# Patient Record
Sex: FEMALE | Race: WHITE | NOT HISPANIC OR LATINO | Employment: OTHER | URBAN - METROPOLITAN AREA
[De-identification: names, ages, dates, MRNs, and addresses within clinical notes are randomized per-mention and may not be internally consistent; named-entity substitution may affect disease eponyms.]

---

## 2022-02-01 ENCOUNTER — PREPPED CHART (OUTPATIENT)
Dept: URBAN - METROPOLITAN AREA CLINIC 10 | Facility: CLINIC | Age: 77
End: 2022-02-01

## 2022-02-01 PROBLEM — H04.123 DRY EYE SYNDROME: Noted: 2022-02-01

## 2022-02-01 PROBLEM — Z96.1 PSEUDOPHAKIA: Noted: 2022-02-01

## 2022-02-01 PROBLEM — H11.32 SUBCONJUNCTIVAL HEMORRHAGE: Noted: 2021-12-09

## 2022-02-01 PROBLEM — H52.4 PRESBYOPIA: Noted: 2022-02-01

## 2022-02-01 PROBLEM — H00.013 EXTERNAL HORDEOLUM: Noted: 2022-02-01

## 2022-09-17 ENCOUNTER — APPOINTMENT (EMERGENCY)
Dept: CT IMAGING | Facility: HOSPITAL | Age: 77
End: 2022-09-17
Payer: COMMERCIAL

## 2022-09-17 ENCOUNTER — APPOINTMENT (OUTPATIENT)
Dept: RADIOLOGY | Facility: HOSPITAL | Age: 77
End: 2022-09-17
Payer: COMMERCIAL

## 2022-09-17 ENCOUNTER — HOSPITAL ENCOUNTER (EMERGENCY)
Facility: HOSPITAL | Age: 77
Discharge: HOME | End: 2022-09-18
Attending: EMERGENCY MEDICINE
Payer: COMMERCIAL

## 2022-09-17 VITALS
RESPIRATION RATE: 18 BRPM | DIASTOLIC BLOOD PRESSURE: 77 MMHG | TEMPERATURE: 97.6 F | HEART RATE: 88 BPM | WEIGHT: 148.81 LBS | BODY MASS INDEX: 24.79 KG/M2 | OXYGEN SATURATION: 96 % | SYSTOLIC BLOOD PRESSURE: 164 MMHG | HEIGHT: 65 IN

## 2022-09-17 DIAGNOSIS — S42.291A HUMERUS HEAD FRACTURE, RIGHT, CLOSED, INITIAL ENCOUNTER: Primary | ICD-10-CM
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
## 2022-09-17 PROCEDURE — 73030 X-RAY EXAM OF SHOULDER: CPT

## 2022-09-17 PROCEDURE — 73060 X-RAY EXAM OF HUMERUS: CPT

## 2022-09-17 PROCEDURE — 70450 CT HEAD/BRAIN W/O DYE: CPT

## 2022-09-17 PROCEDURE — G1004 CDSM NDSC: HCPCS

## 2022-09-17 PROCEDURE — 99284 EMERGENCY DEPT VISIT MOD MDM: CPT

## 2022-09-17 PROCEDURE — 72125 CT NECK SPINE W/O DYE: CPT

## 2022-09-17 RX ORDER — OXYCODONE HYDROCHLORIDE AND ACETAMINOPHEN 5; 325 MG/1; MG/1
1 TABLET ORAL ONCE
Status: COMPLETED | OUTPATIENT
Start: 2022-09-17 | End: 2022-09-17

## 2022-09-17 RX ORDER — OXYCODONE HYDROCHLORIDE AND ACETAMINOPHEN 5; 325 MG/1; MG/1
1 TABLET ORAL EVERY 6 HOURS PRN
Qty: 10 TABLET | Refills: 0 | Status: SHIPPED | OUTPATIENT
Start: 2022-09-17

## 2022-09-17 RX ORDER — OXYCODONE HYDROCHLORIDE AND ACETAMINOPHEN 5; 325 MG/1; MG/1
1 TABLET ORAL ONCE
Status: COMPLETED | OUTPATIENT
Start: 2022-09-17 | End: 2022-09-18

## 2022-09-17 RX ADMIN — OXYCODONE HYDROCHLORIDE AND ACETAMINOPHEN 1 TABLET: 5; 325 TABLET ORAL at 21:40

## 2022-09-18 PROCEDURE — 99284 EMERGENCY DEPT VISIT MOD MDM: CPT | Performed by: EMERGENCY MEDICINE

## 2022-09-18 RX ADMIN — OXYCODONE HYDROCHLORIDE AND ACETAMINOPHEN 1 TABLET: 5; 325 TABLET ORAL at 00:11

## 2022-09-18 NOTE — ED ATTENDING ATTESTATION
9/17/2022  Bethany SCHROEDER Postal, DO, saw and evaluated the patient  I have discussed the patient with the resident/non-physician practitioner and agree with the resident's/non-physician practitioner's findings, Plan of Care, and MDM as documented in the resident's/non-physician practitioner's note, except where noted  All available labs and Radiology studies were reviewed  I was present for key portions of any procedure(s) performed by the resident/non-physician practitioner and I was immediately available to provide assistance  At this point I agree with the current assessment done in the Emergency Department  I have conducted an independent evaluation of this patient a history and physical is as follows:    Patient is a 77-year-old female with a history of atrial fibrillation on Eliquis who presents with a fall  Patient states that she tripped and fell onto her right shoulder  She denies head injury or loss of consciousness  She denies any symptoms preceding the fall, including but not limited to chest pain, palpitations, shortness of breath, diaphoresis or other concerns  She developed acute pain in her right shoulder secondary to the fall  She is unable to move her right upper extremity secondary to pain  She denies any other injuries  On exam, patient is in mild distress secondary to pain  Her right arm is held in adduction  Tenderness to palpation over the right shoulder  Distal pulses intact  Motor, sensation normal   No midline tenderness in the cervical region  Awake, alert and oriented x3  Heart is irregularly irregular  Breath sounds normal   Abdomen is soft, nontender, nondistended  X-ray shows humeral head fracture  Patient is neurovascularly intact  CT head and cervical spine reveal no acute traumatic injury  Patient was given pain medication in the emergency department, with some improvement  Discussed discharge home versus hospitalization for pain control    Patient is comfortable with discharge and will stay with her daughter  She was placed in an arm sling  She is discharged in good condition with outpatient follow-up with Orthopedic surgery  Portions of the above record have been created with voice recognition software  Occasional wrong word or "sound alike" substitutions may have occurred due to the inherent limitations of voice recognition software  Read the chart carefully and recognize, using context, where substitutions may have occurred        ED Course         Critical Care Time  Procedures

## 2022-09-18 NOTE — DISCHARGE INSTRUCTIONS
Please apply ice, 15 minutes at a time, as needed for pain and swelling  Please take tylenol(acetaminophen) 500mg every 4-6 hours as needed for pain  You may take percocet every 6 hours as needed for severe pain  Please do not drive or operate dangerous equipment while taking percocet  Please follow up with orthopedics on Monday for further management of your broken arm  If you develop numbness or tingling of your right arm or hand, please return to the ER

## 2022-09-18 NOTE — ED PROVIDER NOTES
History  Chief Complaint   Patient presents with    Fall     Patient arrived via EMS from [place], c/o right shoulder pain and pain radiating down to elbow  Patient tripped when walking on grass and fell, landing on her right shoulder  +/thinners, -/head strike  Patient is able to move fingers and denies numbness and tingling in hand  Ms Liyah Sparks is a 67 yof presenting with right arm pain s/p mechanical fall on grass  States she was walking on grassy surface, tripped and fell, landing directly on right shoulder  Denies LOC or headstrike, however is on Eliquis  Noted immediate onset of severe right shoulder and upper arm pain, which radiates to right elbow, also unable to move right shoulder but is able to range right elbow, wrist, and fingers, pain is sharp to throbbing in nature, currently 10/10  Has mild scrape on right knee, not currently bleeding, without underlying knee pain, injury, swelling, or decreased ROM  Denies any symptoms of presyncope, neurologic issue, or cardiac issues prior to fall  Denies headache, neck or back pain, blurred vision, lightheadedness, dizziness, chest pain, shortness of breath, abdominal pain, nausea, vomiting, hip pain, difficulty ambulating, or any other injuries  Denies numbness, tingling, coldness, or color changes to her right arm or hand  Last tetanus "years" ago, however adamantly refuses booster  No prior right arm or shoulder injuries  Lives alone  None       History reviewed  No pertinent past medical history  History reviewed  No pertinent surgical history  History reviewed  No pertinent family history  I have reviewed and agree with the history as documented  E-Cigarette/Vaping     E-Cigarette/Vaping Substances           Review of Systems   Constitutional: Negative  Negative for appetite change, chills, diaphoresis, fatigue and fever  HENT: Negative for ear pain, facial swelling, nosebleeds, trouble swallowing and voice change  Eyes: Negative  Negative for photophobia and visual disturbance  Respiratory: Negative  Negative for cough and shortness of breath  Cardiovascular: Negative  Negative for chest pain, palpitations and leg swelling  Gastrointestinal: Negative for abdominal distention, abdominal pain, blood in stool, diarrhea, nausea and vomiting  Endocrine: Negative  Genitourinary: Negative  Negative for decreased urine volume and dysuria  Musculoskeletal: Positive for arthralgias  Negative for back pain, gait problem, neck pain and neck stiffness  Right shoulder pain  Allergic/Immunologic: Negative  Neurological: Negative  Negative for dizziness, syncope, facial asymmetry, speech difficulty, weakness, light-headedness, numbness and headaches  Hematological: Bruises/bleeds easily  Easy bruising 2/2 Eliquis  Psychiatric/Behavioral: Negative  Negative for confusion  All other systems reviewed and are negative  Physical Exam  ED Triage Vitals [09/17/22 2038]   Temperature Pulse Respirations Blood Pressure SpO2   97 6 °F (36 4 °C) 87 18 159/74 98 %      Temp Source Heart Rate Source Patient Position - Orthostatic VS BP Location FiO2 (%)   Oral Monitor Sitting Right arm --      Pain Score       10 - Worst Possible Pain             Orthostatic Vital Signs  Vitals:    09/17/22 2038 09/17/22 2245   BP: 159/74 164/77   Pulse: 87 88   Patient Position - Orthostatic VS: Sitting Lying       Physical Exam  Vitals and nursing note reviewed  Exam conducted with a chaperone present  Constitutional:       Appearance: Normal appearance  She is not diaphoretic  HENT:      Head: Normocephalic and atraumatic  Right Ear: Tympanic membrane and external ear normal       Left Ear: Tympanic membrane and external ear normal       Nose: Nose normal       Mouth/Throat:      Mouth: Mucous membranes are moist       Pharynx: Oropharynx is clear     Eyes:      Extraocular Movements: Extraocular movements intact  Conjunctiva/sclera: Conjunctivae normal       Pupils: Pupils are equal, round, and reactive to light  Cardiovascular:      Rate and Rhythm: Normal rate and regular rhythm  Pulses: Normal pulses  Heart sounds: Normal heart sounds  No murmur heard  No friction rub  No gallop  Pulmonary:      Effort: Pulmonary effort is normal  No respiratory distress  Breath sounds: Normal breath sounds  No wheezing, rhonchi or rales  Chest:      Chest wall: No tenderness  Abdominal:      General: Abdomen is flat  Bowel sounds are normal  There is no distension  Palpations: Abdomen is soft  Tenderness: There is no abdominal tenderness  There is no guarding or rebound  Musculoskeletal:      Right shoulder: Swelling and tenderness present  No bony tenderness  Decreased range of motion  Normal pulse  Left shoulder: Normal       Right upper arm: Swelling, deformity, tenderness and bony tenderness present  No lacerations  Left upper arm: Normal       Right elbow: Normal       Left elbow: Normal       Right forearm: Normal       Left forearm: Normal       Right wrist: Normal  Normal pulse  Left wrist: Normal  Normal pulse  Right hand: Normal strength  Normal sensation  There is no disruption of two-point discrimination  Normal capillary refill  Normal pulse  Left hand: Normal strength  Normal sensation  There is no disruption of two-point discrimination  Normal capillary refill  Normal pulse  Cervical back: Normal, normal range of motion and neck supple  No rigidity or tenderness  Thoracic back: Normal       Lumbar back: Normal       Right hip: Normal       Left hip: Normal       Right upper leg: Normal       Left upper leg: Normal       Right knee: No swelling, deformity, effusion, erythema, bony tenderness or crepitus  Normal range of motion  No tenderness        Left knee: Normal       Right lower leg: Normal       Left lower leg: Normal       Right ankle: Normal       Left ankle: Normal       Right foot: Normal       Left foot: Normal       Comments: Bony tenderness appreciated over the proximal humerus with mild surrounding swelling  Minor abrasion overlying right patella, not actively bleeding  Lymphadenopathy:      Cervical: No cervical adenopathy  Skin:     General: Skin is warm and dry  Capillary Refill: Capillary refill takes less than 2 seconds  Coloration: Skin is not pale  Findings: No erythema or rash  Neurological:      General: No focal deficit present  Mental Status: She is alert and oriented to person, place, and time  Cranial Nerves: No cranial nerve deficit  Sensory: No sensory deficit  Psychiatric:         Mood and Affect: Mood normal          Behavior: Behavior normal          ED Medications  Medications   oxyCODONE-acetaminophen (PERCOCET) 5-325 mg per tablet 1 tablet (1 tablet Oral Given 9/17/22 2140)   oxyCODONE-acetaminophen (PERCOCET) 5-325 mg per tablet 1 tablet (1 tablet Oral Given 9/18/22 0011)       Diagnostic Studies  Results Reviewed     None                 CT head without contrast   Final Result by Kylah Coffman MD (09/17 9795)      No acute intracranial abnormality  Mild chronic small vessel ischemic changes  Workstation performed: [id number]         CT spine cervical without contrast   Final Result by Kylah Coffman MD (09/17 9602)      No cervical spine fracture or traumatic malalignment  Workstation performed: [id number]         XR shoulder 2+ views RIGHT   Final Result by Jose Swanson MD (09/19 7512)      Acute impaction fracture in the proximal right humerus  Workstation performed: [id number]         XR humerus RIGHT   Final Result by Jose Swanson MD (09/19 4397)      Acute impaction fracture in the proximal humerus        Workstation performed: [id number]               Procedures  Procedures      ED Course  ED Course as of 09/20/22 1321   Sat Sep 17, 2022   2147 Mildly displaced, comminuted right humeral head fracture on xray  1 Pt states that pain initially improved, now worse since imaging, requesting additional pain meds prior to discharge  All questions answered  SBIRT 20yo+    Flowsheet Row Most Recent Value   SBIRT (25 yo +)    In order to provide better care to our patients, we are screening all of our patients for alcohol and drug use  Would it be okay to ask you these screening questions? Yes Filed at: 09/17/2022 2144   Initial Alcohol Screen: US AUDIT-C     1  How often do you have a drink containing alcohol? 0 Filed at: 09/17/2022 2144   2  How many drinks containing alcohol do you have on a typical day you are drinking? 0 Filed at: 09/17/2022 2144   3a  Male UNDER 65: How often do you have five or more drinks on one occasion? 0 Filed at: 09/17/2022 2144   3b  FEMALE Any Age, or MALE 65+: How often do you have 4 or more drinks on one occassion? 0 Filed at: 09/17/2022 2144   Audit-C Score 0 Filed at: 09/17/2022 2144   JOAO: How many times in the past year have you    Used an illegal drug or used a prescription medication for non-medical reasons? Never Filed at: 09/17/2022 2144                MDM  Number of Diagnoses or Management Options  Fall, initial encounter  Humerus head fracture, right, closed, initial encounter  Diagnosis management comments: Pt is a 67 yof on eliquis presenting with right upper arm injury s/p mechanical fall without LOC, headstrike, or resulting head/neck/back pain  Also has minor scrape on right knee without underlying injury, not actively bleeding  Pt appears to be in pain, otherwise in no distress, is conversational, VS stable, physical exam as above with concern for right proximal humerus fracture, less likely bony shoulder injury  No other obvious significant injuries  Right upper extremity neurovascularly intact      XR right shoulder/humerus with impacted right humeral head fracture  Pt declines lab evaluation, IV pain medications, or IM pain medications  Pt declines TDAP booster despite extensive risk/benefit discussion  Initially declined CT head and cervical spine(despite risks of injury given distracting injury, age, and use of blood thinners), however revisited this later in the ED course, with consent for same  CT head and cervical spine without acute findings  Given percocet with mild relief of pain,tolerated without significant side effects, however continued to have significant pain  In light of patient being unable to take NSAIDS given Eliquis use, gave second dose of percocet (5-325)to complete full 10/650 dose prior to discharge  Sling placed, ortho referral made  Extensively counseled patient on opioid pain medications adverse effects and safety in the elderly, concern that she lives alone and may need help given limited mobility of right arm  Pt and daughter agree that she will stay with daughter for the next several days  Strict return precautions discussed  Stable, ambulatory on discharge  Amount and/or Complexity of Data Reviewed  Tests in the radiology section of CPT®: ordered and reviewed  Review and summarize past medical records: yes  Discuss the patient with other providers: yes  Independent visualization of images, tracings, or specimens: yes        Disposition  Final diagnoses:   Fall, initial encounter   Humerus head fracture, right, closed, initial encounter     Time reflects when diagnosis was documented in both MDM as applicable and the Disposition within this note     Time User Action Codes Description Comment    9/17/2022 10:25 PM Nelida Funk  FLXM] Fall, initial encounter     9/17/2022 10:26 PM Nelida Magana [A11 [id number]] Humerus head fracture, right, closed, initial encounter     9/17/2022 10:26 PM Anne Bhandari [Z91  DTJS] Fall, initial encounter     9/17/2022 10:26 PM Dot Graham Modify [F85 [id number]] Humerus head fracture, right, closed, initial encounter       ED Disposition     ED Disposition   Discharge    Condition   Stable    Date/Time   Sat Sep 17, 2022 11:29 PM    Comment   Brady Hernandez discharge to home/self care  Follow-up Information     Follow up With Specialties Details Why Contact Info Additional [address] [facility] Schedule an appointment as soon as possible for a visit   [address], [place] [address] [address]  [phone] [address] [place] [address] Specialists [facility], [employer] [place] [address], [address], [place], [place], [address] [address]          Discharge Medication List as of 9/17/2022 11:40 PM      START taking these medications    Details   oxyCODONE-acetaminophen (Percocet) 5-325 mg per tablet Take 1 tablet by mouth every 6 (six) hours as needed for moderate pain Do not drive or drink alcohol while taking medication  Will cause drowsiness  Max Daily Amount: 4 tablets, Starting Sat 9/17/2022, Normal               PDMP Review     None           ED Provider  Attending physically available and evaluated Brady Hernandez I managed the patient along with the ED Attending      Electronically Signed by         Mumtaz Love DO  09/20/22 2832

## 2022-11-28 ENCOUNTER — EMERGENCY VISIT (OUTPATIENT)
Dept: URBAN - METROPOLITAN AREA CLINIC 10 | Facility: CLINIC | Age: 77
End: 2022-11-28

## 2022-11-28 DIAGNOSIS — H11.31: ICD-10-CM

## 2022-11-28 PROCEDURE — 92012 INTRM OPH EXAM EST PATIENT: CPT

## 2022-11-28 ASSESSMENT — TONOMETRY
OS_IOP_MMHG: 12
OD_IOP_MMHG: 10

## 2022-11-28 ASSESSMENT — VISUAL ACUITY
OD_SC: 20/50
OS_SC: 20/25-1

## 2023-02-09 ENCOUNTER — ESTABLISHED COMPREHENSIVE EXAM (OUTPATIENT)
Dept: URBAN - METROPOLITAN AREA CLINIC 10 | Facility: CLINIC | Age: 78
End: 2023-02-09

## 2023-02-09 DIAGNOSIS — Z96.1: ICD-10-CM

## 2023-02-09 DIAGNOSIS — H04.123: ICD-10-CM

## 2023-02-09 DIAGNOSIS — H52.13: ICD-10-CM

## 2023-02-09 PROCEDURE — MISCOPTOS MISCELLANEOUS, OPTOS

## 2023-02-09 PROCEDURE — 92015 DETERMINE REFRACTIVE STATE: CPT

## 2023-02-09 PROCEDURE — 92014 COMPRE OPH EXAM EST PT 1/>: CPT

## 2023-02-09 ASSESSMENT — VISUAL ACUITY
OU_CC: J1
OS_CC: 20/20
OD_CC: 20/30-1

## 2023-02-09 ASSESSMENT — TONOMETRY
OS_IOP_MMHG: 12
OD_IOP_MMHG: 12

## 2024-02-15 ENCOUNTER — ESTABLISHED COMPREHENSIVE EXAM (OUTPATIENT)
Dept: URBAN - METROPOLITAN AREA CLINIC 10 | Facility: CLINIC | Age: 79
End: 2024-02-15

## 2024-02-15 DIAGNOSIS — H04.123: ICD-10-CM

## 2024-02-15 DIAGNOSIS — Z96.1: ICD-10-CM

## 2024-02-15 DIAGNOSIS — H52.13: ICD-10-CM

## 2024-02-15 PROCEDURE — MISCOPTOS MISCELLANEOUS, OPTOS

## 2024-02-15 PROCEDURE — 92014 COMPRE OPH EXAM EST PT 1/>: CPT

## 2024-02-15 PROCEDURE — 92015 DETERMINE REFRACTIVE STATE: CPT

## 2024-02-15 ASSESSMENT — KERATOMETRY
OD_K2POWER_DIOPTERS: 44.75
OD_AXISANGLE2_DEGREES: 15
OD_AXISANGLE_DEGREES: 105
OD_K1POWER_DIOPTERS: 43.75
OS_AXISANGLE2_DEGREES: 85
OS_K1POWER_DIOPTERS: 44.25
OS_K2POWER_DIOPTERS: 44.50
OS_AXISANGLE_DEGREES: 175

## 2024-02-15 ASSESSMENT — VISUAL ACUITY
OD_CC: 20/30
OU_CC: J1
OS_CC: 20/20-2

## 2024-02-15 ASSESSMENT — TONOMETRY
OD_IOP_MMHG: 13
OS_IOP_MMHG: 10

## 2025-08-15 ENCOUNTER — CONSULTATION (OUTPATIENT)
Dept: URBAN - METROPOLITAN AREA CLINIC 10 | Facility: CLINIC | Age: 80
End: 2025-08-15

## 2025-08-15 DIAGNOSIS — H43.813: ICD-10-CM

## 2025-08-15 DIAGNOSIS — H35.363: ICD-10-CM

## 2025-08-15 PROCEDURE — 92134 CPTRZ OPH DX IMG PST SGM RTA: CPT

## 2025-08-15 PROCEDURE — 92201 OPSCPY EXTND RTA DRAW UNI/BI: CPT

## 2025-08-15 PROCEDURE — 92014 COMPRE OPH EXAM EST PT 1/>: CPT

## 2025-08-15 ASSESSMENT — TONOMETRY
OD_IOP_MMHG: 11
OS_IOP_MMHG: 11

## 2025-08-15 ASSESSMENT — VISUAL ACUITY
OS_CC: 20/20
OD_CC: 20/40
OD_PH: 20/25-2

## (undated) RX ORDER — CYCLOSPORINE 0.5 MG/ML: 1 EMULSION OPHTHALMIC TWICE A DAY